# Patient Record
Sex: FEMALE | HISPANIC OR LATINO | Employment: UNEMPLOYED | URBAN - METROPOLITAN AREA
[De-identification: names, ages, dates, MRNs, and addresses within clinical notes are randomized per-mention and may not be internally consistent; named-entity substitution may affect disease eponyms.]

---

## 2022-10-18 ENCOUNTER — OFFICE VISIT (OUTPATIENT)
Dept: INTERNAL MEDICINE | Facility: CLINIC | Age: 27
End: 2022-10-18

## 2022-10-18 VITALS
WEIGHT: 118 LBS | SYSTOLIC BLOOD PRESSURE: 100 MMHG | HEART RATE: 69 BPM | DIASTOLIC BLOOD PRESSURE: 60 MMHG | HEIGHT: 60 IN | TEMPERATURE: 97 F | BODY MASS INDEX: 23.16 KG/M2 | OXYGEN SATURATION: 99 %

## 2022-10-18 DIAGNOSIS — Z11.59 ENCOUNTER FOR HEPATITIS C SCREENING TEST FOR LOW RISK PATIENT: ICD-10-CM

## 2022-10-18 DIAGNOSIS — Z00.00 ANNUAL PHYSICAL EXAM: ICD-10-CM

## 2022-10-18 DIAGNOSIS — Z90.3 H/O GASTRIC SLEEVE: Primary | ICD-10-CM

## 2022-10-18 PROCEDURE — 99203 OFFICE O/P NEW LOW 30 MIN: CPT | Performed by: STUDENT IN AN ORGANIZED HEALTH CARE EDUCATION/TRAINING PROGRAM

## 2022-10-18 RX ORDER — ESTRADIOL VALERATE AND ESTRADIOL VALERATE/DIENOGEST 3-2-1(28)
KIT ORAL
COMMUNITY

## 2022-10-18 NOTE — PROGRESS NOTES
"  Addison Valadez M.D.  Internal Medicine  [facility]  [address], [address]  [place] [address]  [phone]      Chief Complaint  Annual Exam and concerns about gastric sleeve.    SUBJECTIVE    History of Present Illness    Jorge Mendez is a 27 y.o. female who presents to the office today as a new patient to establish care.  She is here with her friend today.  Interpretation done via phone .    She has a history of gastric sleeve in [place] earlier this year in February. She is requesting a gastroenterolgy appointment for EGD per her performing surgeon. She lost a lot of weight after surgeon. She also had a liver ultrasound ordered in [place].  She states ultrasound was ordered because of labs.  She has the lab tests on her phone with her but they are in [place].    Other than this she denies having any particular questions or concerns today.  She denies any significant past medical history.    She is from [place] and moved here this spring.    Review of Systems    No Known Allergies     Outpatient Medications Marked as Taking for the 10/18/22 encounter (Office Visit) with Addison Valadez MD   Medication Sig Dispense Refill   • Estradiol Valerate-Dienogest (Natazia) 3/2-2/2-3/1 MG tablet Take  by mouth.          History reviewed. No pertinent past medical history.  Past Surgical History:   Procedure Laterality Date   • BREAST AUGMENTATION       Family History   Problem Relation Age of Onset   • Thyroid disease Mother     reports that she has never smoked. She does not have any smokeless tobacco history on file. She reports current alcohol use.    OBJECTIVE    Vital Signs:   /60   Pulse 69   Temp 97 °F (36.1 °C) (Infrared)   Ht 153 cm (60.24\")   Wt 53.5 kg (118 lb)   SpO2 99%   BMI 22.87 kg/m²     Physical Exam  Constitutional:       Appearance: Normal appearance. She is normal weight.   Cardiovascular:      Rate and Rhythm: Normal rate and regular " rhythm.      Heart sounds: Normal heart sounds. No murmur heard.  Pulmonary:      Effort: Pulmonary effort is normal.      Breath sounds: Normal breath sounds.   Abdominal:      General: Abdomen is flat. There is no distension.      Palpations: Abdomen is soft.      Tenderness: There is no abdominal tenderness.   Skin:     General: Skin is warm and dry.   Neurological:      Mental Status: She is alert.   Psychiatric:         Mood and Affect: Mood normal.         Behavior: Behavior normal.         Thought Content: Thought content normal.            The following data was reviewed by: Addison Valadez MD on 10/18/2022:    There is no prior data to review.  Patient has some prior labs on her phone but they are in [place].            Data reviewed: No data to review.             ASSESSMENT & PLAN     27-year-old from [facility] without significant past medical history here for concerns for tests and procedures for follow-up from [place].  We will repeat CMP as there is some concern for liver issue in [place].    Diagnoses and all orders for this visit:    1. H/O gastric sleeve (Primary)  Assessment & Plan:  Patient states her performing surgeon in [place] had ordered a EGD as follow-up for her gastric sleeve.  I have referred her to a gastroenterology here to make sure she gets her proper follow up    Orders:  -     Cancel: Ambulatory Referral to Gastroenterology  -     Vitamin B12  -     Folate  -     Ambulatory Referral to Gastroenterology    2. Annual physical exam  -     Comprehensive Metabolic Panel  -     CBC & Differential  -     Hemoglobin A1c  -     TSH Rfx On Abnormal To Free T4    3. Encounter for hepatitis C screening test for low risk patient  -     HCV Antibody Rfx To Qnt PCR        Health Maintenance Due   Topic Date Due   • COVID-19 Vaccine (1) Never done   • ANNUAL PHYSICAL  Never done   • TDAP/TD VACCINES (1 - Tdap) Never done   • INFLUENZA VACCINE  Never done   • HEPATITIS C SCREENING  Never done   • PAP  SMEAR  Never done        Follow Up  Return in about 3 months (around 1/18/2023).  For Pap smear    Patient/family had no further questions at this time and verbalized understanding of the plan discussed today.

## 2022-10-19 LAB
ALBUMIN SERPL-MCNC: 4.2 G/DL (ref 3.9–5)
ALBUMIN/GLOB SERPL: 1.7 {RATIO} (ref 1.2–2.2)
ALP SERPL-CCNC: 50 IU/L (ref 44–121)
ALT SERPL-CCNC: 11 IU/L (ref 0–32)
AST SERPL-CCNC: 15 IU/L (ref 0–40)
BASOPHILS # BLD AUTO: 0.1 X10E3/UL (ref 0–0.2)
BASOPHILS NFR BLD AUTO: 1 %
BILIRUB SERPL-MCNC: 0.3 MG/DL (ref 0–1.2)
BUN SERPL-MCNC: 10 MG/DL (ref 6–20)
BUN/CREAT SERPL: 15 (ref 9–23)
CALCIUM SERPL-MCNC: 10.2 MG/DL (ref 8.7–10.2)
CHLORIDE SERPL-SCNC: 107 MMOL/L (ref 96–106)
CO2 SERPL-SCNC: 21 MMOL/L (ref 20–29)
CREAT SERPL-MCNC: 0.67 MG/DL (ref 0.57–1)
EGFRCR SERPLBLD CKD-EPI 2021: 122 ML/MIN/1.73
EOSINOPHIL # BLD AUTO: 0.3 X10E3/UL (ref 0–0.4)
EOSINOPHIL NFR BLD AUTO: 4 %
ERYTHROCYTE [DISTWIDTH] IN BLOOD BY AUTOMATED COUNT: 11.9 % (ref 11.7–15.4)
FOLATE SERPL-MCNC: 19.9 NG/ML
GLOBULIN SER CALC-MCNC: 2.5 G/DL (ref 1.5–4.5)
GLUCOSE SERPL-MCNC: 74 MG/DL (ref 70–99)
HBA1C MFR BLD: 5.1 % (ref 4.8–5.6)
HCT VFR BLD AUTO: 36.8 % (ref 34–46.6)
HCV AB S/CO SERPL IA: <0.1 S/CO RATIO (ref 0–0.9)
HCV AB SERPL QL IA: NORMAL
HGB BLD-MCNC: 12.1 G/DL (ref 11.1–15.9)
IMM GRANULOCYTES # BLD AUTO: 0 X10E3/UL (ref 0–0.1)
IMM GRANULOCYTES NFR BLD AUTO: 0 %
LYMPHOCYTES # BLD AUTO: 3.2 X10E3/UL (ref 0.7–3.1)
LYMPHOCYTES NFR BLD AUTO: 49 %
MCH RBC QN AUTO: 29.7 PG (ref 26.6–33)
MCHC RBC AUTO-ENTMCNC: 32.9 G/DL (ref 31.5–35.7)
MCV RBC AUTO: 90 FL (ref 79–97)
MONOCYTES # BLD AUTO: 0.4 X10E3/UL (ref 0.1–0.9)
MONOCYTES NFR BLD AUTO: 6 %
NEUTROPHILS # BLD AUTO: 2.7 X10E3/UL (ref 1.4–7)
NEUTROPHILS NFR BLD AUTO: 40 %
PLATELET # BLD AUTO: 318 X10E3/UL (ref 150–450)
POTASSIUM SERPL-SCNC: 4.3 MMOL/L (ref 3.5–5.2)
PROT SERPL-MCNC: 6.7 G/DL (ref 6–8.5)
RBC # BLD AUTO: 4.08 X10E6/UL (ref 3.77–5.28)
SODIUM SERPL-SCNC: 140 MMOL/L (ref 134–144)
TSH SERPL DL<=0.005 MIU/L-ACNC: 1.88 UIU/ML (ref 0.45–4.5)
VIT B12 SERPL-MCNC: 411 PG/ML (ref 232–1245)
WBC # BLD AUTO: 6.7 X10E3/UL (ref 3.4–10.8)

## 2022-10-19 NOTE — ASSESSMENT & PLAN NOTE
Patient states her performing surgeon in [place] had ordered a EGD as follow-up for her gastric sleeve.  I have referred her to a gastroenterology here to make sure she gets her proper follow up